# Patient Record
Sex: FEMALE
[De-identification: names, ages, dates, MRNs, and addresses within clinical notes are randomized per-mention and may not be internally consistent; named-entity substitution may affect disease eponyms.]

---

## 2018-01-26 ENCOUNTER — HOSPITAL ENCOUNTER (OUTPATIENT)
Dept: HOSPITAL 14 - H.OPSURG | Age: 68
Discharge: HOME | End: 2018-01-26
Attending: PODIATRIST
Payer: MEDICARE

## 2018-01-26 VITALS — BODY MASS INDEX: 20.5 KG/M2

## 2018-01-26 VITALS — SYSTOLIC BLOOD PRESSURE: 139 MMHG | TEMPERATURE: 97.6 F | DIASTOLIC BLOOD PRESSURE: 78 MMHG | HEART RATE: 76 BPM

## 2018-01-26 VITALS — OXYGEN SATURATION: 97 %

## 2018-01-26 VITALS — RESPIRATION RATE: 18 BRPM

## 2018-01-26 DIAGNOSIS — M20.11: ICD-10-CM

## 2018-01-26 DIAGNOSIS — M20.41: Primary | ICD-10-CM

## 2018-01-26 PROCEDURE — 28296 COR HLX VLGS DSTL MTAR OSTEO: CPT

## 2018-01-26 PROCEDURE — 88304 TISSUE EXAM BY PATHOLOGIST: CPT

## 2018-01-26 PROCEDURE — 97116 GAIT TRAINING THERAPY: CPT

## 2018-01-26 PROCEDURE — 97161 PT EVAL LOW COMPLEX 20 MIN: CPT

## 2018-01-26 RX ADMIN — BUPIVACAINE HYDROCHLORIDE ONE ML: 5 INJECTION, SOLUTION EPIDURAL; INTRACAUDAL; PERINEURAL at 10:00

## 2018-01-26 RX ADMIN — BUPIVACAINE HYDROCHLORIDE ONE ML: 5 INJECTION, SOLUTION EPIDURAL; INTRACAUDAL; PERINEURAL at 08:05

## 2018-01-26 NOTE — CP.SDSHP
Same Day Surgery [facility] & P





- History


Proposed Procedure: right foot bunionectomy and 2nd hammertoe toe correction 

with Weil osteotomy


Pre-Op Diagnosis: painful bunion, 2nd hammertoe, and 2nd metatarsalagia, all on 

the right foot





- Allergies


Allergies: 


Allergies





dust Allergy (Uncoded 12/15/16 15:13)


 SHORTNESS OF BREATH











- Physical Exam


Mental Status: Alert & Oriented x3





- {Optional Preform as Required}


Integument: WNL





- Impression


Impression: Pt was seen and examined in SDS.  Pt NPO status was confirmed.  All 

pre-op testing and clearance in chart.  Pt has exhausted all conservative 

treatment at this time and is opting for surgical intervention.  Pt was 

explained procedure and post-operative course.  All pt's questions were 

answered to satisfaction.  No guarantees were made.  Pt understands all risks, 

benefits and complications of procedure.  Pt will follow-up with Dr. Mathew 

within 1 week of surgery





- Date & Time


Date: 01/26/18


Time: 08:00





Short Stay Discharge





- Short Stay Discharge


Admitting Diagnosis/Reason for Visit: M20.41/ M20.11/


Disposition: HOME/ ROUTINE


Referrals: 


Mike Patel MD [Primary Care Provider] - 


Instructions:  RICE Therapy (GEN)


Additional Instructions (Diet, Activity): 


-Patient in good/stable condition for discharge home


-Pt to resume medications per medical reconciliation


-Resume regular diet


Please keep dressing clean, dry, & intact to surgical site


-Use plastic bag over bandage for showering


-Wear post op shoe at all times when ambulating


-Call clinic if you see signs of infection (redness, swelling, malodor)


-Please make an appointment to see Dr. Mathew in office/clinic within 1 week 

for post-op check





Progress Note/Discharge Note with Instructions: 





- Patient evaluated bedside in recovery s/p surgical procedure.


- After surgical procedure patient in NAD


- (+) Void, (+) Appetite


- Capillary refill time <3s and NVSI intact.


- Patient denies complaints at this time


- Post operative instructions and plan of care explained to patient at length.


- Pt. acknowledges understanding.


- Patient stable for DC per podiatric surgery

## 2018-01-26 NOTE — CP.PCM.PN
Subjective





- Date & Time of Evaluation


Date of Evaluation: 01/26/18


Time of Evaluation: 06:54





- Subjective


Subjective: 


Podiatry Perioperative Evaluation- Dr. Mathew





67 y.o female seen and evaluated in [facility] for right foot bunionectomy and 2nd 

hammertoe toe correction with Weil. Patient reports the 2nd toe is bothering 

her and causing her pain. Its worse when she is wearing or ambulating in shoes. 

She also notes that her bunion is slowly shifting over to the 2nd digit which 

is causing more pain to the toe. All conservative treatment has been exhausted 

and now patient opts for surgical intervention. Patient reports nothing to eat 

since 8 pm last night and last drank liquids at 11:30 pm last night. She 

reports having allergies to dust mite. She denies n/v/sob/cp/chills or f.





PMH: hx of TIA, hx of left wrist fracture


PSH: tonsillectomy, adenoids removal, vitrectomy, left 5th metatarsal ORIF


ALL: dust mites


MEDS: NAC supplement, Cerefolin


SH: reports drinking wine with dinner, denies illict drug use, denies smoking





Objective





- Vital Signs/Intake and Output


Vital Signs (last 24 hours): 


 











Temp Pulse Resp BP Pulse Ox


 


 97.9 F   70   20   137/86   100 


 


 01/19/18 11:29  01/19/18 11:29  01/19/18 11:29  01/19/18 11:29  01/19/18 11:29











- Constitutional


Appears: Well, Non-toxic, No Acute Distress





- Extremities Exam


Extremities Exam: absent: Calf Tenderness


Additional comments: 








Vasc: DP and PT 2/4 bilaterally, temperature gradient warm to cool b/l, CFT < 3 

seconds x 10 digits, no edema noted to the foot b/l


Neuro: protective and gross sensation intact bilaterally


Derm: erythema noted to the distal tip of 2nd digit, dorsum of 2nd digit at DIPJ

, PIPF, and medial eminence of 1st mpj; Mild bursa noted to the medial aspect 

of the medial eminence of 1st mpj. no open lesions, no clinical signs of 

infection.


Ortho:


Patient has decreased ankle joint ROM. Ankle joint dorsiflexion b/l < 10 

degrees with knee extended, L>R. Ankle joint dorsiflexion >10 degrees with the 

left knee flexed. Ankle joint dorsiflexion <10 degrees with the right knee 

flexed. Negative anterior drawer test. Negative talar tilt. Muscle strength is 5

/5 b/l for all dorsiflexors, plantarfexors, inverters, and everters without 

pain. STJ ROM is full B/L with 20 degrees inversion and 10 degrees eversion. 

LLE RCSP is 4 degrees everted with NCSP 0 degrees. RLE RCSP 6 degrees everted 

with NCSP 0 degrees. ROM of MTJ is normal without pain or crepitus b/l. The 

forefoot is perpendicular to the rearfoot. First ray ROM is WNL b/l. 1st MPJ 

ROM is 50 degrees without pain or crepitus b/l. Medial eminence noted to first 

metatarsal. Mild pain with palpation to the medial eminence. Left foot 5th 

metatarsal lateral eminence noted with tenderness to palpation. Semi-rigid 

hammertoe deformity noted to left 2nd digit. Extensors contracture noted to all 

digits, elongated 2nd digit. Pain with palpation of sub metatarsal 2.








- Neurological Exam


Neurological Exam: Alert, Awake, Oriented x3





- Psychiatric Exam


Psychiatric exam: Normal Affect, Normal Mood





Assessment and Plan





- Assessment and Plan (Free Text)


Assessment: 





67 y.o female seen and evaluated in [facility] for right foot bunionectomy and 2nd 

hammertoe toe correction with Weil.


Plan: 





Pt was seen and examined in [facility]





Pt NPO status was confirmed





All Pre-op testing and clearance was in the chart





Pt has exhausted all conservative treatment at this time and is opting for 

surgical intervention





Pt was explained procedure and post-operative course





All pt's questions were answered to satisfaction





No guarantees were made





Pt understands all risks, benefits and complications of procedure





Pt will follow-up with Dr. Mathew

## 2018-01-26 NOTE — PCM.SURG1
Surgeon's Initial Post Op Note





- Surgeon's Notes


Surgeon: Dr. Mathew DPM


Assistant: Dr. Woody DPM


Type of Anesthesia: General LMA


Anesthesia Administered By: Dr. Holt


Pre-Operative Diagnosis: right painful bunion, right painful 2nd hammertoe, 2nd 

metatarsalgia


Operative Findings: see dictations; injectables: 20 cc of 1% lidocaine plain; 

intra-operative: 10 cc of .5% marcaine plain; materials: 3-0, 4-0 vicryl, 4-0 

nylon, 1 staple speed implant [id number], 2 orthosorb 40x1.3mm, 1 2.0 x 11 snap 

off screw


Post-Operative Diagnosis: same


Operation Performed: 1st right Perry with internal fixation, right 1st 

proximal Akin with internal fixation, right 2nd weil osteotomy with internal 

fixation


Specimen/Specimens Removed: none


Estimated Blood Loss: EBL {In ML}: 0


Blood Products Given: N/A


Drains Used: No Drains


Post-Op Condition: Good


Date of Surgery/Procedure: 01/26/18


Time of Surgery/Procedure: 07:45

## 2018-01-29 NOTE — OP
PROCEDURE DATE:  01/26/2018



SURGEON:  Bahman Mathew DPM.



ASSISTANT:  Kinga Woody DPM, PGY-2.



TYPE OF ANESTHESIA:  General LMA.



ANESTHESIA ADMINISTERED BY:  Russel Holt MD.



PREOPERATIVE DIAGNOSES:

1.  Right foot painful hallux abductovalgus deformity.

2.  Right foot second elongated and plantarflexed metatarsal.



POSTOPERATIVE DIAGNOSES:

1.  Right foot painful hallux abductovalgus deformity.

2.  Right foot second elongated and plantarflexed metatarsal.



PROCEDURE:

1.  Right foot surgical repair of hallux abductovalgus deformity with first

metatarsal and phalangeal osteotomies with the use of internal fixation.

2.  Right foot second metatarsal osteotomy with the use of internal

fixation.



INDICATIONS:  The patient is a 67-year-old female with the above-mentioned

diagnoses.  The patient has exhausted multiple forms of conservative

treatment at this time and now requests surgical intervention.  The patient

signed the consent after careful explanation of risks, benefits,

complications and alternatives for surgical procedure, no guarantees were

given or implied.



DESCRIPTION OF PROCEDURE:  The patient was brought into the operating room

and placed on the operating room table in a supine position.  A timeout was

performed for identification of the correct patient and procedure.  The

patient received a total of 20 mL of 1% lidocaine plain in a local

block-type fashion to the right foot.  Once local and general anesthesia

was achieved, the foot was then prepped and draped in normal sterile

manner.  The patient's foot was elevated and the pneumatic ankle tourniquet

was then inflated to 250 mmHg and the procedure began.



PROCEDURE #1:  Attention was directed to the dorsal aspect of the first

metatarsal head of the right foot where an approximately 5-cm linear

longitudinal incision was made medial and parallel to the tendon of the

extensor hallucis longus involving the contour of the deformity.  The

incision was deepened through subcutaneous tissue using sharp and blunt

dissection.  Care had been taken to identify and retract all vital and

neurovascular structures.  All bleeders were cauterized and ligated as

necessary.  Attention was then directed to the first inner space via the

original incision where the tendon of the extensor hallucis brevis was

identified and tenectomized.  The conjoint tendon of the abductor hallucis

muscle was then identified and transected at its attachments to the base of

proximal phalanx and of the hallux.  At this time, the lateral contracture

present at the hallux was noted to be reduced.  At this time, an inverted

L-type capsulotomy was performed over the dorsal aspect of the first

metatarsophalangeal joint.  Periosteal and capsular structures were then

carefully dissected free of their osseous attachments and reflected

medially and laterally, thus exposing the head of the first metatarsal into

the operative filed.  Next, utilizing a sagittal bone saw, the dorsal and

medial _____ were resected and passed from the operating field.  All rough

edges were then smoothed down with a bone rasp.  Attention was then

redirected to the medial aspect of the first metatarsal head where a

through-and-through V-type osteotomy was created in the metaphyseal region

of the bone utilizing a sagittal bone saw.  The apex of the osteotomy

pointed distally with the arm plane proximal plantar and proximal dorsal. 

The dorsal arm was made longer to accommodate for internal fixation.  Upon

completion of the osteotomy, the capital fragment was distracted and

shifted laterally into a more corrected position and impacted upon the

first metatarsal head.  At this time, a 0.045 inch K-wire was driven from

dorsal to plantar across the osteotomy site to serve as temporary fixation.

Following sequential removal of the K-wires and following standard AO

principles and technique, two Orthosorb pins measuring 20 mm x 1.3 mm each

were inserted in their place across the osteotomy site with excellent

compression noted.  Attention was then redirected to the remaining medial

bone shelves, which were resected utilizing a sagittal bone saw and passed

off the operative field.  Correction of the deformity was noted and

assessed at this time and noted to be excellent.  Next, the long extensor

tendon was then lengthened in a V-type manner via the original incision. 

The periosteal and capsular structures were then reflected medially and

laterally exposing the base of the proximal phalanx of the operative site. 

Next, utilizing a sagittal bone saw, dorsal to plantar proximal osteotomy

was created in the proximal phalanx of the right hallux.  A wedge of bone

was resected and passed from the operative site.  Following standard AO

principles and techniques, a 9 mm x 10 mm staple was inserted and placed

across the osteotomy site with excellent compression noted.  The surgical

site was then flushed with copious amounts of sterile normal saline.  The

periosteal and capsular structures were then reapproximated using 3-0

Vicryl.  The subcutaneous tissue was then reapproximated using 4-0 Vicryl

and the skin was reapproximated with 4-0 nylon.



PROCEDURE #2:  Right foot second metatarsal osteotomy with screw fixation. 

Attention was then drawn to the second metatarsophalangeal joint where a

3-cm linear longitudinal incision was made dorsal to the second

metatarsophalangeal joint.  The incision was carried down to _____ planes

paying careful attention to all neurovascular structures that were

retracted, ligated, and cauterized as necessary.  The incision was deepened

down to the level of the long extensor tendon.  The long extensor tendon

was identified and retracted laterally.  Attention was then drawn to the

capsular and periosteal tissue where a sharp periosteal and capsular

incision was created being careful not to score the articular cartilage of

the second metatarsal head.  The periosteal tissue was then sharply

reflected from the areas to expose the head and neck of the second

metatarsal.  At this time, with the use of a sagittal saw, a _____ type

osteotomy was crated at the second metatarsal starting from the most

proximal portion of the articular cartilage, the dorsal aspect of second

metatarsal and an oblique cut was made staying parallel to the

weightbearing surface and a through-and-through osteotomy was created.  The

capital fragment was transposed proximally.  It was palpated and noted to

be consistent with a normal parabola.  This permanently fixated with a 2.0

x 11 mm cortical snap-off screw using standard AO technique.  The osteotomy

was noted to be in good anatomic alignment.  Postoperative contraction

_____ was noted.  There was no postoperative contracture at this time and

the correction of the deformity was noted to be excellent.  The

subcutaneous incision was then reapproximated with 3-0 and 4-0 Vicryl and

the skin was reapproximated with 4-0 nylon.  A postoperative block

including 10 mL of 0.5% Marcaine plain was given in a local block-type

fashion to the right foot.  The surgical sites were then dressed with

Xeroform, 4 x 4 gauze, Kerlix, Dominga, and ACE bandage.



POSTOPERATIVE CONDITION:  The patient tolerated the anesthesia and

procedure well and was escorted to the recovery room with vital signs

stable and neurovascular status intact to the right foot.  The patent will

follow up with Dr. Mathew on an outpatient basis.

__________________________________________

Kinga Woody DPM



__________________________________________

Bahman Mathew DPM

DD:  01/27/2018 12:19:16

DT:  01/27/2018 14:21:53

[facility] # [id number]